# Patient Record
Sex: MALE | Race: WHITE | NOT HISPANIC OR LATINO | URBAN - NONMETROPOLITAN AREA
[De-identification: names, ages, dates, MRNs, and addresses within clinical notes are randomized per-mention and may not be internally consistent; named-entity substitution may affect disease eponyms.]

---

## 2021-07-13 ENCOUNTER — OFFICE VISIT (OUTPATIENT)
Dept: URBAN - NONMETROPOLITAN AREA CLINIC 2 | Facility: CLINIC | Age: 76
End: 2021-07-13
Payer: MEDICARE

## 2021-07-13 ENCOUNTER — WEB ENCOUNTER (OUTPATIENT)
Dept: URBAN - NONMETROPOLITAN AREA CLINIC 2 | Facility: CLINIC | Age: 76
End: 2021-07-13

## 2021-07-13 VITALS
HEART RATE: 72 BPM | SYSTOLIC BLOOD PRESSURE: 146 MMHG | DIASTOLIC BLOOD PRESSURE: 89 MMHG | HEIGHT: 69 IN | BODY MASS INDEX: 21.48 KG/M2 | WEIGHT: 145 LBS

## 2021-07-13 DIAGNOSIS — K59.00 CONSTIPATION, UNSPECIFIED CONSTIPATION TYPE: ICD-10-CM

## 2021-07-13 DIAGNOSIS — Z79.02 LONG TERM (CURRENT) USE OF ANTITHROMBOTICS/ANTIPLATELETS: ICD-10-CM

## 2021-07-13 DIAGNOSIS — Z86.010 PERSONAL HISTORY OF COLONIC POLYPS: ICD-10-CM

## 2021-07-13 PROCEDURE — 99203 OFFICE O/P NEW LOW 30 MIN: CPT | Performed by: INTERNAL MEDICINE

## 2021-07-13 RX ORDER — ATORVASTATIN CALCIUM 10 MG/1
1 TABLET TABLET, FILM COATED ORAL ONCE A DAY
Status: ACTIVE | COMMUNITY

## 2021-07-13 RX ORDER — METOPROLOL TARTRATE 25 MG/1
1 TABLET WITH FOOD TABLET, FILM COATED ORAL TWICE A DAY
Status: ACTIVE | COMMUNITY

## 2021-07-13 RX ORDER — ALLOPURINOL 300 MG/1
1 TABLET TABLET ORAL ONCE A DAY
Status: ACTIVE | COMMUNITY

## 2021-07-13 RX ORDER — POLYETHYLENE GLYCOL 3350, SODIUM SULFATE, SODIUM CHLORIDE, POTASSIUM CHLORIDE, ASCORBIC ACID, SODIUM ASCORBATE 140-9-5.2G
AS DIRECTED KIT ORAL AS DIRECTED
Qty: 280 GRAM | Refills: 0 | OUTPATIENT
Start: 2021-07-13 | End: 2021-07-14

## 2021-07-13 RX ORDER — FINASTERIDE 5 MG/1
1 TABLET TABLET, FILM COATED ORAL ONCE A DAY
Status: ACTIVE | COMMUNITY

## 2021-07-13 RX ORDER — FLUOXETINE HYDROCHLORIDE 10 MG/1
1 CAPSULE CAPSULE ORAL ONCE A DAY
Status: ACTIVE | COMMUNITY

## 2021-07-13 RX ORDER — DIPHENHYDRAMINE HYDROCHLORIDE 50 MG/1
1 CAPSULE AT BEDTIME AS NEEDED CAPSULE ORAL ONCE A DAY
Status: ACTIVE | COMMUNITY

## 2021-07-13 RX ORDER — ALFUZOSIN HYDROCHLORIDE 10 MG/1
1 TABLET IMMEDIATELY AFTER THE SAME MEAL TABLET, FILM COATED, EXTENDED RELEASE ORAL ONCE A DAY
Status: ACTIVE | COMMUNITY

## 2021-07-13 RX ORDER — NORTRIPTYLINE HYDROCHLORIDE 25 MG/1
1 CAPSULE CAPSULE ORAL ONCE A DAY
Status: ACTIVE | COMMUNITY

## 2021-07-13 RX ORDER — AMLODIPINE BESYLATE 5 MG/1
1 TABLET TABLET ORAL ONCE A DAY
Status: ACTIVE | COMMUNITY

## 2021-07-13 RX ORDER — APIXABAN 5 MG/1
AS DIRECTED TABLET, FILM COATED ORAL
Status: ACTIVE | COMMUNITY

## 2021-07-13 NOTE — HPI-TODAY'S VISIT:
7/13/2021: Initial Gastroenterology Clinic Visit  75 year old gentleman with past medical history of colon polyps, hypertension, atrial flutter on apixaban, hyperlipidemia, gout, personal history of colon polyps who presents for surveillance colonoscopy.   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 He believes he has had colon polyps in the past with recommendation for repeat colonoscopy every 5 years.  He denies melena, hematochezia, unintentional weight loss. He has a history of constipation but has been using a smooth move tea in order to facilitate bowel movements.   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   He denies family history of colon cancer.

## 2021-07-24 ENCOUNTER — TELEPHONE ENCOUNTER (OUTPATIENT)
Dept: URBAN - NONMETROPOLITAN AREA CLINIC 2 | Facility: CLINIC | Age: 76
End: 2021-07-24

## 2021-09-02 ENCOUNTER — OFFICE VISIT (OUTPATIENT)
Dept: URBAN - NONMETROPOLITAN AREA SURGERY CENTER 1 | Facility: SURGERY CENTER | Age: 76
End: 2021-09-02

## 2021-09-23 ENCOUNTER — OFFICE VISIT (OUTPATIENT)
Dept: URBAN - NONMETROPOLITAN AREA SURGERY CENTER 1 | Facility: SURGERY CENTER | Age: 76
End: 2021-09-23

## 2021-09-27 ENCOUNTER — OFFICE VISIT (OUTPATIENT)
Dept: URBAN - NONMETROPOLITAN AREA CLINIC 2 | Facility: CLINIC | Age: 76
End: 2021-09-27

## 2021-09-27 RX ORDER — DIPHENHYDRAMINE HYDROCHLORIDE 50 MG/1
1 CAPSULE AT BEDTIME AS NEEDED CAPSULE ORAL ONCE A DAY
Status: ACTIVE | COMMUNITY

## 2021-09-27 RX ORDER — NORTRIPTYLINE HYDROCHLORIDE 25 MG/1
1 CAPSULE CAPSULE ORAL ONCE A DAY
Status: ACTIVE | COMMUNITY

## 2021-09-27 RX ORDER — APIXABAN 5 MG/1
AS DIRECTED TABLET, FILM COATED ORAL
Status: ACTIVE | COMMUNITY

## 2021-09-27 RX ORDER — FLUOXETINE HYDROCHLORIDE 10 MG/1
1 CAPSULE CAPSULE ORAL ONCE A DAY
Status: ACTIVE | COMMUNITY

## 2021-09-27 RX ORDER — ATORVASTATIN CALCIUM 10 MG/1
1 TABLET TABLET, FILM COATED ORAL ONCE A DAY
Status: ACTIVE | COMMUNITY

## 2021-09-27 RX ORDER — METOPROLOL TARTRATE 25 MG/1
1 TABLET WITH FOOD TABLET, FILM COATED ORAL TWICE A DAY
Status: ACTIVE | COMMUNITY

## 2021-09-27 RX ORDER — AMLODIPINE BESYLATE 5 MG/1
1 TABLET TABLET ORAL ONCE A DAY
Status: ACTIVE | COMMUNITY

## 2021-09-27 RX ORDER — ALFUZOSIN HYDROCHLORIDE 10 MG/1
1 TABLET IMMEDIATELY AFTER THE SAME MEAL TABLET, FILM COATED, EXTENDED RELEASE ORAL ONCE A DAY
Status: ACTIVE | COMMUNITY

## 2021-09-27 RX ORDER — FINASTERIDE 5 MG/1
1 TABLET TABLET, FILM COATED ORAL ONCE A DAY
Status: ACTIVE | COMMUNITY

## 2021-09-27 RX ORDER — ALLOPURINOL 300 MG/1
1 TABLET TABLET ORAL ONCE A DAY
Status: ACTIVE | COMMUNITY

## 2021-09-27 NOTE — HPI-TODAY'S VISIT:
7/13/2021: Initial Gastroenterology Clinic Visit  75 year old gentleman with past medical history of colon polyps, hypertension, atrial flutter on apixaban, hyperlipidemia, gout, personal history of colon polyps who presents for surveillance colonoscopy.   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 He believes he has had colon polyps in the past with recommendation for repeat colonoscopy every 5 years.  He denies melena, hematochezia, unintentional weight loss. He has a history of constipation but has been using a smooth move tea in order to facilitate bowel movements.   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   He denies family history of colon cancer.

## 2021-09-30 ENCOUNTER — TELEPHONE ENCOUNTER (OUTPATIENT)
Dept: URBAN - NONMETROPOLITAN AREA CLINIC 2 | Facility: CLINIC | Age: 76
End: 2021-09-30

## 2021-09-30 ENCOUNTER — OFFICE VISIT (OUTPATIENT)
Dept: URBAN - NONMETROPOLITAN AREA SURGERY CENTER 1 | Facility: SURGERY CENTER | Age: 76
End: 2021-09-30
Payer: MEDICARE

## 2021-09-30 ENCOUNTER — CLAIMS CREATED FROM THE CLAIM WINDOW (OUTPATIENT)
Dept: URBAN - METROPOLITAN AREA CLINIC 4 | Facility: CLINIC | Age: 76
End: 2021-09-30
Payer: MEDICARE

## 2021-09-30 DIAGNOSIS — K63.89 BACTERIAL OVERGROWTH SYNDROME: ICD-10-CM

## 2021-09-30 DIAGNOSIS — Z86.010 ADENOMAS PERSONAL HISTORY OF COLONIC POLYPS: ICD-10-CM

## 2021-09-30 DIAGNOSIS — D12.2 BENIGN NEOPLASM OF ASCENDING COLON: ICD-10-CM

## 2021-09-30 DIAGNOSIS — D12.2 ADENOMA OF ASCENDING COLON: ICD-10-CM

## 2021-09-30 DIAGNOSIS — D12.3 ADENOMA OF TRANSVERSE COLON: ICD-10-CM

## 2021-09-30 DIAGNOSIS — K63.89 POLYP, HYPERPLASTIC: ICD-10-CM

## 2021-09-30 DIAGNOSIS — D12.5 BENIGN NEOPLASM OF SIGMOID COLON: ICD-10-CM

## 2021-09-30 DIAGNOSIS — D12.3 BENIGN NEOPLASM OF TRANSVERSE COLON: ICD-10-CM

## 2021-09-30 DIAGNOSIS — D12.5 ADENOMA OF SIGMOID COLON: ICD-10-CM

## 2021-09-30 PROCEDURE — G8907 PT DOC NO EVENTS ON DISCHARG: HCPCS | Performed by: INTERNAL MEDICINE

## 2021-09-30 PROCEDURE — 45380 COLONOSCOPY AND BIOPSY: CPT | Performed by: INTERNAL MEDICINE

## 2021-09-30 PROCEDURE — 45385 COLONOSCOPY W/LESION REMOVAL: CPT | Performed by: INTERNAL MEDICINE

## 2021-09-30 PROCEDURE — 88305 TISSUE EXAM BY PATHOLOGIST: CPT | Performed by: PATHOLOGY

## 2021-09-30 RX ORDER — FINASTERIDE 5 MG/1
1 TABLET TABLET, FILM COATED ORAL ONCE A DAY
Status: ACTIVE | COMMUNITY

## 2021-09-30 RX ORDER — NORTRIPTYLINE HYDROCHLORIDE 25 MG/1
1 CAPSULE CAPSULE ORAL ONCE A DAY
Status: ACTIVE | COMMUNITY

## 2021-09-30 RX ORDER — AMLODIPINE BESYLATE 5 MG/1
1 TABLET TABLET ORAL ONCE A DAY
Status: ACTIVE | COMMUNITY

## 2021-09-30 RX ORDER — DIPHENHYDRAMINE HYDROCHLORIDE 50 MG/1
1 CAPSULE AT BEDTIME AS NEEDED CAPSULE ORAL ONCE A DAY
Status: ACTIVE | COMMUNITY

## 2021-09-30 RX ORDER — ATORVASTATIN CALCIUM 10 MG/1
1 TABLET TABLET, FILM COATED ORAL ONCE A DAY
Status: ACTIVE | COMMUNITY

## 2021-09-30 RX ORDER — ALFUZOSIN HYDROCHLORIDE 10 MG/1
1 TABLET IMMEDIATELY AFTER THE SAME MEAL TABLET, FILM COATED, EXTENDED RELEASE ORAL ONCE A DAY
Status: ACTIVE | COMMUNITY

## 2021-09-30 RX ORDER — APIXABAN 5 MG/1
AS DIRECTED TABLET, FILM COATED ORAL
Status: ACTIVE | COMMUNITY

## 2021-09-30 RX ORDER — FLUOXETINE HYDROCHLORIDE 10 MG/1
1 CAPSULE CAPSULE ORAL ONCE A DAY
Status: ACTIVE | COMMUNITY

## 2021-09-30 RX ORDER — ALLOPURINOL 300 MG/1
1 TABLET TABLET ORAL ONCE A DAY
Status: ACTIVE | COMMUNITY

## 2021-09-30 RX ORDER — METOPROLOL TARTRATE 25 MG/1
1 TABLET WITH FOOD TABLET, FILM COATED ORAL TWICE A DAY
Status: ACTIVE | COMMUNITY

## 2021-11-17 ENCOUNTER — OFFICE VISIT (OUTPATIENT)
Dept: URBAN - NONMETROPOLITAN AREA CLINIC 13 | Facility: CLINIC | Age: 76
End: 2021-11-17

## 2021-11-17 RX ORDER — NORTRIPTYLINE HYDROCHLORIDE 25 MG/1
1 CAPSULE CAPSULE ORAL ONCE A DAY
Status: ACTIVE | COMMUNITY

## 2021-11-17 RX ORDER — ALFUZOSIN HYDROCHLORIDE 10 MG/1
1 TABLET IMMEDIATELY AFTER THE SAME MEAL TABLET, FILM COATED, EXTENDED RELEASE ORAL ONCE A DAY
Status: ACTIVE | COMMUNITY

## 2021-11-17 RX ORDER — ALLOPURINOL 300 MG/1
1 TABLET TABLET ORAL ONCE A DAY
Status: ACTIVE | COMMUNITY

## 2021-11-17 RX ORDER — ATORVASTATIN CALCIUM 10 MG/1
1 TABLET TABLET, FILM COATED ORAL ONCE A DAY
Status: ACTIVE | COMMUNITY

## 2021-11-17 RX ORDER — FINASTERIDE 5 MG/1
1 TABLET TABLET, FILM COATED ORAL ONCE A DAY
Status: ACTIVE | COMMUNITY

## 2021-11-17 RX ORDER — AMLODIPINE BESYLATE 5 MG/1
1 TABLET TABLET ORAL ONCE A DAY
Status: ACTIVE | COMMUNITY

## 2021-11-17 RX ORDER — FLUOXETINE HYDROCHLORIDE 10 MG/1
1 CAPSULE CAPSULE ORAL ONCE A DAY
Status: ACTIVE | COMMUNITY

## 2021-11-17 RX ORDER — METOPROLOL TARTRATE 25 MG/1
1 TABLET WITH FOOD TABLET, FILM COATED ORAL TWICE A DAY
Status: ACTIVE | COMMUNITY

## 2021-11-17 RX ORDER — APIXABAN 5 MG/1
AS DIRECTED TABLET, FILM COATED ORAL
Status: ACTIVE | COMMUNITY

## 2021-11-17 RX ORDER — DIPHENHYDRAMINE HYDROCHLORIDE 50 MG/1
1 CAPSULE AT BEDTIME AS NEEDED CAPSULE ORAL ONCE A DAY
Status: ACTIVE | COMMUNITY

## 2021-11-17 NOTE — HPI-TODAY'S VISIT:
7/13/2021: Initial Gastroenterology Clinic Visit   75 year old gentleman with past medical history of colon polyps, hypertension, atrial flutter on apixaban, hyperlipidemia, gout, personal history of colon polyps who presents for surveillance colonoscopy.   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 He believes he has had colon polyps in the past with recommendation for repeat colonoscopy every 5 years.  He denies melena, hematochezia, unintentional weight loss. He has a history of constipation but has been using a smooth move tea in order to facilitate bowel movements.   He has a PhD in food sciences. He previously worked for the [employer] in the "[employer]" sciences department.   He denies family history of colon cancer.  9/30/2021: Colonoscopy  The perianal and digital rectal examinations were normal. The terminal ileum was normal. 7 mm polyp in the cecum. Polypectomy performed. Five polyps in the ascending colon ranging from 3 to 5 mm. Polypectomy performed. Six polyps were found in the transverse colon, measuring 3 to 6 mm in size. Polypectomy performed. 5 mm nodule was found in the transverse colon. Biopsied. Two polyps in the sigmoid colon measuring 4 to 6 mm in size. Polypectomy performed. Multiple small and large mouthed diverticula were found in the sigmoid colon.  Retroflexion in the rectum normal.  9/30/2021: Pathology from Colonoscopy  (A) Colon, Sigmoid, Polypectomy: TUBULAR ADENOMA(S). (B) Colon, Transverse, Polypectomy: TUBULAR ADENOMA(S). (C) Colon, Ascending, Polypectomy: TUBULAR ADENOMA(S). HYPERPLASTIC POLYP(S). (D) Colon, Transverse, Biopsy: NO SIGNIFICANT ABNORMALITIES. See Comment. COMMENT: While the histologic findings in this biopsy are not enough to explain an endoscopic impression of a nodular lesion, a superficial biopsy may not be deep enough to explore a submucosal lesion. Clinical correlation is recommended. Plan: -Repeat colonoscopy in 6 months. -Cardiac clearance. -High fiber diet.

## 2022-01-12 ENCOUNTER — OFFICE VISIT (OUTPATIENT)
Dept: URBAN - NONMETROPOLITAN AREA CLINIC 13 | Facility: CLINIC | Age: 77
End: 2022-01-12
Payer: MEDICARE

## 2022-01-12 ENCOUNTER — WEB ENCOUNTER (OUTPATIENT)
Dept: URBAN - NONMETROPOLITAN AREA CLINIC 13 | Facility: CLINIC | Age: 77
End: 2022-01-12

## 2022-01-12 VITALS
HEIGHT: 69 IN | HEART RATE: 71 BPM | SYSTOLIC BLOOD PRESSURE: 135 MMHG | BODY MASS INDEX: 22.51 KG/M2 | DIASTOLIC BLOOD PRESSURE: 73 MMHG | WEIGHT: 152 LBS

## 2022-01-12 DIAGNOSIS — Z79.02 LONG TERM (CURRENT) USE OF ANTITHROMBOTICS/ANTIPLATELETS: ICD-10-CM

## 2022-01-12 DIAGNOSIS — K57.90 DIVERTICULOSIS: ICD-10-CM

## 2022-01-12 DIAGNOSIS — Z86.010 PERSONAL HISTORY OF COLONIC POLYPS: ICD-10-CM

## 2022-01-12 DIAGNOSIS — K59.00 CONSTIPATION, UNSPECIFIED CONSTIPATION TYPE: ICD-10-CM

## 2022-01-12 PROCEDURE — 99213 OFFICE O/P EST LOW 20 MIN: CPT | Performed by: INTERNAL MEDICINE

## 2022-01-12 RX ORDER — POLYETHYLENE GLYCOL 3350, SODIUM SULFATE, SODIUM CHLORIDE, POTASSIUM CHLORIDE, ASCORBIC ACID, SODIUM ASCORBATE 140-9-5.2G
AS DIRECTED KIT ORAL AS DIRECTED
Qty: 280 GRAM | Refills: 0 | OUTPATIENT
Start: 2022-01-12 | End: 2022-01-13

## 2022-01-12 RX ORDER — FINASTERIDE 5 MG/1
1 TABLET TABLET, FILM COATED ORAL ONCE A DAY
Status: ACTIVE | COMMUNITY

## 2022-01-12 RX ORDER — FLUOXETINE HYDROCHLORIDE 10 MG/1
1 CAPSULE CAPSULE ORAL ONCE A DAY
Status: ACTIVE | COMMUNITY

## 2022-01-12 RX ORDER — NORTRIPTYLINE HYDROCHLORIDE 25 MG/1
1 CAPSULE CAPSULE ORAL ONCE A DAY
Status: ACTIVE | COMMUNITY

## 2022-01-12 RX ORDER — ATORVASTATIN CALCIUM 10 MG/1
1 TABLET TABLET, FILM COATED ORAL ONCE A DAY
Status: ACTIVE | COMMUNITY

## 2022-01-12 RX ORDER — DIPHENHYDRAMINE HYDROCHLORIDE 50 MG/1
1 CAPSULE AT BEDTIME AS NEEDED CAPSULE ORAL ONCE A DAY
Status: ACTIVE | COMMUNITY

## 2022-01-12 RX ORDER — ALFUZOSIN HYDROCHLORIDE 10 MG/1
1 TABLET IMMEDIATELY AFTER THE SAME MEAL TABLET, FILM COATED, EXTENDED RELEASE ORAL ONCE A DAY
Status: ACTIVE | COMMUNITY

## 2022-01-12 RX ORDER — METOPROLOL TARTRATE 25 MG/1
1 TABLET WITH FOOD TABLET, FILM COATED ORAL TWICE A DAY
Status: ACTIVE | COMMUNITY

## 2022-01-12 RX ORDER — AMLODIPINE BESYLATE 5 MG/1
1 TABLET TABLET ORAL ONCE A DAY
Status: ACTIVE | COMMUNITY

## 2022-01-12 RX ORDER — ALLOPURINOL 300 MG/1
1 TABLET TABLET ORAL ONCE A DAY
Status: ACTIVE | COMMUNITY

## 2022-01-12 RX ORDER — APIXABAN 5 MG/1
AS DIRECTED TABLET, FILM COATED ORAL
Status: ACTIVE | COMMUNITY

## 2022-01-12 NOTE — HPI-TODAY'S VISIT:
7/13/2021: Initial Gastroenterology Clinic Visit    75 year old gentleman with past medical history of colon polyps, hypertension, atrial flutter on apixaban, hyperlipidemia, gout, personal history of colon polyps who presents for surveillance colonoscopy.   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 He believes he has had colon polyps in the past with recommendation for repeat colonoscopy every 5 years.  He denies melena, hematochezia, unintentional weight loss. He has a history of constipation but has been using a smooth move tea in order to facilitate bowel movements.   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   He denies family history of colon cancer.  9/30/2021: Colonoscopy  The perianal and digital rectal examinations were normal. The terminal ileum was normal. 7 mm polyp in the cecum. Polypectomy performed. Five polyps in the ascending colon ranging from 3 to 5 mm. Polypectomy performed. Six polyps were found in the transverse colon, measuring 3 to 6 mm in size. Polypectomy performed. 5 mm nodule was found in the transverse colon. Biopsied. Two polyps in the sigmoid colon measuring 4 to 6 mm in size. Polypectomy performed. Multiple small and large mouthed diverticula were found in the sigmoid colon.  Retroflexion in the rectum normal.  9/30/2021: Pathology from Colonoscopy  (A) Colon, Sigmoid, Polypectomy: TUBULAR ADENOMA(S). (B) Colon, Transverse, Polypectomy: TUBULAR ADENOMA(S). (C) Colon, Ascending, Polypectomy: TUBULAR ADENOMA(S). HYPERPLASTIC POLYP(S). (D) Colon, Transverse, Biopsy: NO SIGNIFICANT ABNORMALITIES. See Comment. COMMENT: While the histologic findings in this biopsy are not enough to explain an endoscopic impression of a nodular lesion, a superficial biopsy may not be deep enough to explore a submucosal lesion. Clinical correlation is recommended.  1/12/2022: Gastroenterology Follow-Up Visit  Mr. Crenshaw denies melena, hematochezia. He does note a history of constipation where he can go several days without having a bowel movement. He does use a smooth tea to help facilitate bowel movements.

## 2022-03-11 ENCOUNTER — CLAIMS CREATED FROM THE CLAIM WINDOW (OUTPATIENT)
Dept: URBAN - METROPOLITAN AREA CLINIC 4 | Facility: CLINIC | Age: 77
End: 2022-03-11
Payer: MEDICARE

## 2022-03-11 ENCOUNTER — OFFICE VISIT (OUTPATIENT)
Dept: URBAN - NONMETROPOLITAN AREA SURGERY CENTER 1 | Facility: SURGERY CENTER | Age: 77
End: 2022-03-11
Payer: MEDICARE

## 2022-03-11 DIAGNOSIS — D12.3 ADENOMA OF TRANSVERSE COLON: ICD-10-CM

## 2022-03-11 DIAGNOSIS — D12.3 BENIGN NEOPLASM OF TRANSVERSE COLON: ICD-10-CM

## 2022-03-11 DIAGNOSIS — D12.5 BENIGN NEOPLASM OF SIGMOID COLON: ICD-10-CM

## 2022-03-11 DIAGNOSIS — D12.0 BENIGN NEOPLASM OF CECUM: ICD-10-CM

## 2022-03-11 DIAGNOSIS — D12.2 ADENOMA OF ASCENDING COLON: ICD-10-CM

## 2022-03-11 DIAGNOSIS — D12.0 ADENOMA OF CECUM: ICD-10-CM

## 2022-03-11 DIAGNOSIS — D12.2 BENIGN NEOPLASM OF ASCENDING COLON: ICD-10-CM

## 2022-03-11 DIAGNOSIS — Z86.010 ADENOMAS PERSONAL HISTORY OF COLONIC POLYPS: ICD-10-CM

## 2022-03-11 DIAGNOSIS — D12.5 ADENOMA OF SIGMOID COLON: ICD-10-CM

## 2022-03-11 PROCEDURE — G8907 PT DOC NO EVENTS ON DISCHARG: HCPCS | Performed by: INTERNAL MEDICINE

## 2022-03-11 PROCEDURE — 88305 TISSUE EXAM BY PATHOLOGIST: CPT | Performed by: PATHOLOGY

## 2022-03-11 PROCEDURE — 45385 COLONOSCOPY W/LESION REMOVAL: CPT | Performed by: INTERNAL MEDICINE

## 2022-03-11 PROCEDURE — 45380 COLONOSCOPY AND BIOPSY: CPT | Performed by: INTERNAL MEDICINE

## 2022-03-11 RX ORDER — ALFUZOSIN HYDROCHLORIDE 10 MG/1
1 TABLET IMMEDIATELY AFTER THE SAME MEAL TABLET, FILM COATED, EXTENDED RELEASE ORAL ONCE A DAY
Status: ACTIVE | COMMUNITY

## 2022-03-11 RX ORDER — FINASTERIDE 5 MG/1
1 TABLET TABLET, FILM COATED ORAL ONCE A DAY
Status: ACTIVE | COMMUNITY

## 2022-03-11 RX ORDER — METOPROLOL TARTRATE 25 MG/1
1 TABLET WITH FOOD TABLET, FILM COATED ORAL TWICE A DAY
Status: ACTIVE | COMMUNITY

## 2022-03-11 RX ORDER — FLUOXETINE HYDROCHLORIDE 10 MG/1
1 CAPSULE CAPSULE ORAL ONCE A DAY
Status: ACTIVE | COMMUNITY

## 2022-03-11 RX ORDER — DIPHENHYDRAMINE HYDROCHLORIDE 50 MG/1
1 CAPSULE AT BEDTIME AS NEEDED CAPSULE ORAL ONCE A DAY
Status: ACTIVE | COMMUNITY

## 2022-03-11 RX ORDER — ALLOPURINOL 300 MG/1
1 TABLET TABLET ORAL ONCE A DAY
Status: ACTIVE | COMMUNITY

## 2022-03-11 RX ORDER — NORTRIPTYLINE HYDROCHLORIDE 25 MG/1
1 CAPSULE CAPSULE ORAL ONCE A DAY
Status: ACTIVE | COMMUNITY

## 2022-03-11 RX ORDER — ATORVASTATIN CALCIUM 10 MG/1
1 TABLET TABLET, FILM COATED ORAL ONCE A DAY
Status: ACTIVE | COMMUNITY

## 2022-03-11 RX ORDER — APIXABAN 5 MG/1
AS DIRECTED TABLET, FILM COATED ORAL
Status: ACTIVE | COMMUNITY

## 2022-03-11 RX ORDER — AMLODIPINE BESYLATE 5 MG/1
1 TABLET TABLET ORAL ONCE A DAY
Status: ACTIVE | COMMUNITY

## 2022-03-30 ENCOUNTER — OFFICE VISIT (OUTPATIENT)
Dept: URBAN - NONMETROPOLITAN AREA CLINIC 13 | Facility: CLINIC | Age: 77
End: 2022-03-30

## 2022-03-30 RX ORDER — NORTRIPTYLINE HYDROCHLORIDE 25 MG/1
1 CAPSULE CAPSULE ORAL ONCE A DAY
Status: ACTIVE | COMMUNITY

## 2022-03-30 RX ORDER — DIPHENHYDRAMINE HYDROCHLORIDE 50 MG/1
1 CAPSULE AT BEDTIME AS NEEDED CAPSULE ORAL ONCE A DAY
Status: ACTIVE | COMMUNITY

## 2022-03-30 RX ORDER — ALFUZOSIN HYDROCHLORIDE 10 MG/1
1 TABLET IMMEDIATELY AFTER THE SAME MEAL TABLET, FILM COATED, EXTENDED RELEASE ORAL ONCE A DAY
Status: ACTIVE | COMMUNITY

## 2022-03-30 RX ORDER — METOPROLOL TARTRATE 25 MG/1
1 TABLET WITH FOOD TABLET, FILM COATED ORAL TWICE A DAY
Status: ACTIVE | COMMUNITY

## 2022-03-30 RX ORDER — FLUOXETINE HYDROCHLORIDE 10 MG/1
1 CAPSULE CAPSULE ORAL ONCE A DAY
Status: ACTIVE | COMMUNITY

## 2022-03-30 RX ORDER — ATORVASTATIN CALCIUM 10 MG/1
1 TABLET TABLET, FILM COATED ORAL ONCE A DAY
Status: ACTIVE | COMMUNITY

## 2022-03-30 RX ORDER — AMLODIPINE BESYLATE 5 MG/1
1 TABLET TABLET ORAL ONCE A DAY
Status: ACTIVE | COMMUNITY

## 2022-03-30 RX ORDER — ALLOPURINOL 300 MG/1
1 TABLET TABLET ORAL ONCE A DAY
Status: ACTIVE | COMMUNITY

## 2022-03-30 RX ORDER — FINASTERIDE 5 MG/1
1 TABLET TABLET, FILM COATED ORAL ONCE A DAY
Status: ACTIVE | COMMUNITY

## 2022-03-30 RX ORDER — APIXABAN 5 MG/1
AS DIRECTED TABLET, FILM COATED ORAL
Status: ACTIVE | COMMUNITY

## 2022-03-30 NOTE — HPI-TODAY'S VISIT:
7/13/2021: Initial Gastroenterology Clinic Visit     75 year old gentleman with past medical history of colon polyps, hypertension, atrial flutter on apixaban, hyperlipidemia, gout, personal history of colon polyps who presents for surveillance colonoscopy.   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 He believes he has had colon polyps in the past with recommendation for repeat colonoscopy every 5 years.  He denies melena, hematochezia, unintentional weight loss. He has a history of constipation but has been using a smooth move tea in order to facilitate bowel movements.   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   He denies family history of colon cancer.  9/30/2021: Colonoscopy  The perianal and digital rectal examinations were normal. The terminal ileum was normal. 7 mm polyp in the cecum. Polypectomy performed. Five polyps in the ascending colon ranging from 3 to 5 mm. Polypectomy performed. Six polyps were found in the transverse colon, measuring 3 to 6 mm in size. Polypectomy performed. 5 mm nodule was found in the transverse colon. Biopsied. Two polyps in the sigmoid colon measuring 4 to 6 mm in size. Polypectomy performed. Multiple small and large mouthed diverticula were found in the sigmoid colon.  Retroflexion in the rectum normal.  9/30/2021: Pathology from Colonoscopy  (A) Colon, Sigmoid, Polypectomy: TUBULAR ADENOMA(S). (B) Colon, Transverse, Polypectomy: TUBULAR ADENOMA(S). (C) Colon, Ascending, Polypectomy: TUBULAR ADENOMA(S). HYPERPLASTIC POLYP(S). (D) Colon, Transverse, Biopsy: NO SIGNIFICANT ABNORMALITIES. See Comment. COMMENT: While the histologic findings in this biopsy are not enough to explain an endoscopic impression of a nodular lesion, a superficial biopsy may not be deep enough to explore a submucosal lesion. Clinical correlation is recommended.  1/12/2022: Gastroenterology Follow-Up Visit  Mr. Crenshaw denies melena, hematochezia. He does note a history of constipation where he can go several days without having a bowel movement. He does use a smooth tea to help facilitate bowel movements.  3/11/2022: Colonoscopy  3 mm polyp in the cecum. Polypectomy performed. Three sessile polyps were found in the ascending colon ranging from 2 to 3 mm. Polypectomy performed. 4 mm polyp in the transverse colon. Polypectomy performed.  3 mm polyp in the sigmoid colon. Polypectomy performed. Multiple small and large mouthed diverticula were found in the sigmoid colon.  3/11/2022: Pathology from Colonoscopy  (A) Cecum, Polypectomy: TUBULAR ADENOMA(S). (B) Colon, Ascending, Polypectomy: TUBULAR ADENOMA(S). (C) Colon, Transverse, Polypectomy: TUBULAR ADENOMA(S). (D) Colon, Sigmoid, Polypectomy: TUBULAR ADENOMA(S). Plan: -High fiber diet. -Colonoscopy in 1 year.

## 2022-08-10 ENCOUNTER — LAB OUTSIDE AN ENCOUNTER (OUTPATIENT)
Dept: URBAN - NONMETROPOLITAN AREA CLINIC 13 | Facility: CLINIC | Age: 77
End: 2022-08-10

## 2022-08-10 ENCOUNTER — WEB ENCOUNTER (OUTPATIENT)
Dept: URBAN - NONMETROPOLITAN AREA CLINIC 13 | Facility: CLINIC | Age: 77
End: 2022-08-10

## 2022-08-10 ENCOUNTER — OFFICE VISIT (OUTPATIENT)
Dept: URBAN - NONMETROPOLITAN AREA CLINIC 13 | Facility: CLINIC | Age: 77
End: 2022-08-10
Payer: MEDICARE

## 2022-08-10 VITALS
WEIGHT: 158 LBS | SYSTOLIC BLOOD PRESSURE: 128 MMHG | HEIGHT: 69 IN | HEART RATE: 74 BPM | DIASTOLIC BLOOD PRESSURE: 80 MMHG | BODY MASS INDEX: 23.4 KG/M2

## 2022-08-10 DIAGNOSIS — Z86.010 PERSONAL HISTORY OF COLONIC POLYPS: ICD-10-CM

## 2022-08-10 DIAGNOSIS — K57.90 DIVERTICULOSIS: ICD-10-CM

## 2022-08-10 DIAGNOSIS — K59.00 CONSTIPATION, UNSPECIFIED CONSTIPATION TYPE: ICD-10-CM

## 2022-08-10 DIAGNOSIS — R93.5 ABNORMAL COMPUTED TOMOGRAPHY OF ABDOMEN AND PELVIS: ICD-10-CM

## 2022-08-10 DIAGNOSIS — Z79.02 LONG TERM (CURRENT) USE OF ANTITHROMBOTICS/ANTIPLATELETS: ICD-10-CM

## 2022-08-10 PROCEDURE — 99214 OFFICE O/P EST MOD 30 MIN: CPT | Performed by: INTERNAL MEDICINE

## 2022-08-10 RX ORDER — ALFUZOSIN HYDROCHLORIDE 10 MG/1
1 TABLET IMMEDIATELY AFTER THE SAME MEAL TABLET, FILM COATED, EXTENDED RELEASE ORAL ONCE A DAY
Status: ACTIVE | COMMUNITY

## 2022-08-10 RX ORDER — FLUOXETINE HYDROCHLORIDE 10 MG/1
1 CAPSULE CAPSULE ORAL ONCE A DAY
Status: ACTIVE | COMMUNITY

## 2022-08-10 RX ORDER — APIXABAN 5 MG/1
AS DIRECTED TABLET, FILM COATED ORAL
Status: ACTIVE | COMMUNITY

## 2022-08-10 RX ORDER — METOPROLOL TARTRATE 25 MG/1
1 TABLET WITH FOOD TABLET, FILM COATED ORAL TWICE A DAY
Status: ACTIVE | COMMUNITY

## 2022-08-10 RX ORDER — FINASTERIDE 5 MG/1
1 TABLET TABLET, FILM COATED ORAL ONCE A DAY
Status: ACTIVE | COMMUNITY

## 2022-08-10 RX ORDER — ATORVASTATIN CALCIUM 10 MG/1
1 TABLET TABLET, FILM COATED ORAL ONCE A DAY
Status: ACTIVE | COMMUNITY

## 2022-08-10 RX ORDER — NORTRIPTYLINE HYDROCHLORIDE 25 MG/1
1 CAPSULE CAPSULE ORAL ONCE A DAY
Status: ACTIVE | COMMUNITY

## 2022-08-10 RX ORDER — AMLODIPINE BESYLATE 5 MG/1
1 TABLET TABLET ORAL ONCE A DAY
Status: ACTIVE | COMMUNITY

## 2022-08-10 RX ORDER — DIPHENHYDRAMINE HYDROCHLORIDE 50 MG/1
1 CAPSULE AT BEDTIME AS NEEDED CAPSULE ORAL ONCE A DAY
Status: ACTIVE | COMMUNITY

## 2022-08-10 RX ORDER — ALLOPURINOL 300 MG/1
1 TABLET TABLET ORAL ONCE A DAY
Status: ACTIVE | COMMUNITY

## 2022-08-10 NOTE — HPI-TODAY'S VISIT:
7/13/2021: Initial Gastroenterology Clinic Visit      75 year old gentleman with past medical history of colon polyps, hypertension, atrial flutter on apixaban, hyperlipidemia, gout, personal history of colon polyps who presents for surveillance colonoscopy.   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 He believes he has had colon polyps in the past with recommendation for repeat colonoscopy every 5 years.  He denies melena, hematochezia, unintentional weight loss. He has a history of constipation but has been using a smooth move tea in order to facilitate bowel movements.   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   He denies family history of colon cancer.  9/30/2021: Colonoscopy  The perianal and digital rectal examinations were normal. The terminal ileum was normal. 7 mm polyp in the cecum. Polypectomy performed. Five polyps in the ascending colon ranging from 3 to 5 mm. Polypectomy performed. Six polyps were found in the transverse colon, measuring 3 to 6 mm in size. Polypectomy performed. 5 mm nodule was found in the transverse colon. Biopsied. Two polyps in the sigmoid colon measuring 4 to 6 mm in size. Polypectomy performed. Multiple small and large mouthed diverticula were found in the sigmoid colon.  Retroflexion in the rectum normal.  9/30/2021: Pathology from Colonoscopy  (A) Colon, Sigmoid, Polypectomy: TUBULAR ADENOMA(S). (B) Colon, Transverse, Polypectomy: TUBULAR ADENOMA(S). (C) Colon, Ascending, Polypectomy: TUBULAR ADENOMA(S). HYPERPLASTIC POLYP(S). (D) Colon, Transverse, Biopsy: NO SIGNIFICANT ABNORMALITIES. See Comment. COMMENT: While the histologic findings in this biopsy are not enough to explain an endoscopic impression of a nodular lesion, a superficial biopsy may not be deep enough to explore a submucosal lesion. Clinical correlation is recommended.  1/12/2022: Gastroenterology Follow-Up Visit  Mr. Crenshaw denies melena, hematochezia. He does note a history of constipation where he can go several days without having a bowel movement. He does use a smooth tea to help facilitate bowel movements.  3/11/2022: Colonoscopy  3 mm polyp in the cecum. Polypectomy performed. Three sessile polyps were found in the ascending colon ranging from 2 to 3 mm. Polypectomy performed. 4 mm polyp in the transverse colon. Polypectomy performed.  3 mm polyp in the sigmoid colon. Polypectomy performed. Multiple small and large mouthed diverticula were found in the sigmoid colon.  3/11/2022: Pathology from Colonoscopy  (A) Cecum, Polypectomy: TUBULAR ADENOMA(S). (B) Colon, Ascending, Polypectomy: TUBULAR ADENOMA(S). (C) Colon, Transverse, Polypectomy: TUBULAR ADENOMA(S). (D) Colon, Sigmoid, Polypectomy: TUBULAR ADENOMA(S).   8/10/2022: Gastroenterology Follow-Up Visit Dr. Crenshaw presents for follow-up. He was seen by his Urologist for monitoring of a right kidney lesion. Underwent CT Scan Abdomen and Pelvis WITHOUT Contrast 4/13/2022 which showed "4 cm rounded area of primarily fluid attenuation which demonstrate a central area of the focal enhancement measuring 2.6 cm in greatest craniocaudal dimension." A subsequent CT Abdomen and Pelvis WITH Contrast and WITHOUT Contrast was performed 5/11/2022 which showed a "4.5 cm area of soft tissue density in the right mid to lower abdomen which demonstrates nodular enhancement centrally concerning for a Meckel's diverticulum."  Denies abdominal pain, melena, hematochezia, unintentional weight loss.

## 2022-09-29 ENCOUNTER — OFFICE VISIT (OUTPATIENT)
Dept: URBAN - NONMETROPOLITAN AREA CLINIC 2 | Facility: CLINIC | Age: 77
End: 2022-09-29
Payer: MEDICARE

## 2022-09-29 VITALS
WEIGHT: 157.4 LBS | DIASTOLIC BLOOD PRESSURE: 72 MMHG | HEART RATE: 81 BPM | HEIGHT: 69 IN | BODY MASS INDEX: 23.31 KG/M2 | SYSTOLIC BLOOD PRESSURE: 111 MMHG

## 2022-09-29 DIAGNOSIS — K57.90 DIVERTICULOSIS: ICD-10-CM

## 2022-09-29 DIAGNOSIS — K59.00 CONSTIPATION, UNSPECIFIED CONSTIPATION TYPE: ICD-10-CM

## 2022-09-29 DIAGNOSIS — Z79.02 LONG TERM (CURRENT) USE OF ANTITHROMBOTICS/ANTIPLATELETS: ICD-10-CM

## 2022-09-29 DIAGNOSIS — K66.1 INTRAPERITONEAL BLEEDING: ICD-10-CM

## 2022-09-29 DIAGNOSIS — R93.5 ABNORMAL COMPUTED TOMOGRAPHY OF ABDOMEN AND PELVIS: ICD-10-CM

## 2022-09-29 DIAGNOSIS — Z86.010 PERSONAL HISTORY OF COLONIC POLYPS: ICD-10-CM

## 2022-09-29 PROCEDURE — 99214 OFFICE O/P EST MOD 30 MIN: CPT | Performed by: INTERNAL MEDICINE

## 2022-09-29 RX ORDER — ATORVASTATIN CALCIUM 10 MG/1
1 TABLET TABLET, FILM COATED ORAL ONCE A DAY
Status: ACTIVE | COMMUNITY

## 2022-09-29 RX ORDER — ALFUZOSIN HYDROCHLORIDE 10 MG/1
1 TABLET IMMEDIATELY AFTER THE SAME MEAL TABLET, FILM COATED, EXTENDED RELEASE ORAL ONCE A DAY
Status: ACTIVE | COMMUNITY

## 2022-09-29 RX ORDER — FLUOXETINE HYDROCHLORIDE 10 MG/1
1 CAPSULE CAPSULE ORAL ONCE A DAY
Status: ACTIVE | COMMUNITY

## 2022-09-29 RX ORDER — FINASTERIDE 5 MG/1
1 TABLET TABLET, FILM COATED ORAL ONCE A DAY
Status: ACTIVE | COMMUNITY

## 2022-09-29 RX ORDER — AMLODIPINE BESYLATE 5 MG/1
1 TABLET TABLET ORAL ONCE A DAY
Status: ACTIVE | COMMUNITY

## 2022-09-29 RX ORDER — APIXABAN 5 MG/1
AS DIRECTED TABLET, FILM COATED ORAL
Status: ACTIVE | COMMUNITY

## 2022-09-29 RX ORDER — DIPHENHYDRAMINE HYDROCHLORIDE 50 MG/1
1 CAPSULE AT BEDTIME AS NEEDED CAPSULE ORAL ONCE A DAY
Status: ACTIVE | COMMUNITY

## 2022-09-29 RX ORDER — ALLOPURINOL 300 MG/1
1 TABLET TABLET ORAL ONCE A DAY
Status: ACTIVE | COMMUNITY

## 2022-09-29 RX ORDER — METOPROLOL TARTRATE 25 MG/1
1 TABLET WITH FOOD TABLET, FILM COATED ORAL TWICE A DAY
Status: ACTIVE | COMMUNITY

## 2022-09-29 RX ORDER — NORTRIPTYLINE HYDROCHLORIDE 25 MG/1
1 CAPSULE CAPSULE ORAL ONCE A DAY
Status: ACTIVE | COMMUNITY

## 2022-09-29 NOTE — HPI-TODAY'S VISIT:
7/13/2021: Initial Gastroenterology Clinic Visit      75 year old gentleman with past medical history of colon polyps, hypertension, atrial flutter on apixaban, hyperlipidemia, gout, personal history of colon polyps who presents for surveillance colonoscopy.   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 He believes he has had colon polyps in the past with recommendation for repeat colonoscopy every 5 years.  He denies melena, hematochezia, unintentional weight loss. He has a history of constipation but has been using a smooth move tea in order to facilitate bowel movements.   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   He denies family history of colon cancer.  9/30/2021: Colonoscopy  The perianal and digital rectal examinations were normal. The terminal ileum was normal. 7 mm polyp in the cecum. Polypectomy performed. Five polyps in the ascending colon ranging from 3 to 5 mm. Polypectomy performed. Six polyps were found in the transverse colon, measuring 3 to 6 mm in size. Polypectomy performed. 5 mm nodule was found in the transverse colon. Biopsied. Two polyps in the sigmoid colon measuring 4 to 6 mm in size. Polypectomy performed. Multiple small and large mouthed diverticula were found in the sigmoid colon.  Retroflexion in the rectum normal.  9/30/2021: Pathology from Colonoscopy  (A) Colon, Sigmoid, Polypectomy: TUBULAR ADENOMA(S). (B) Colon, Transverse, Polypectomy: TUBULAR ADENOMA(S). (C) Colon, Ascending, Polypectomy: TUBULAR ADENOMA(S). HYPERPLASTIC POLYP(S). (D) Colon, Transverse, Biopsy: NO SIGNIFICANT ABNORMALITIES. See Comment. COMMENT: While the histologic findings in this biopsy are not enough to explain an endoscopic impression of a nodular lesion, a superficial biopsy may not be deep enough to explore a submucosal lesion. Clinical correlation is recommended.  1/12/2022: Gastroenterology Follow-Up Visit  Mr. Crenshaw denies melena, hematochezia. He does note a history of constipation where he can go several days without having a bowel movement. He does use a smooth tea to help facilitate bowel movements.  3/11/2022: Colonoscopy  3 mm polyp in the cecum. Polypectomy performed. Three sessile polyps were found in the ascending colon ranging from 2 to 3 mm. Polypectomy performed. 4 mm polyp in the transverse colon. Polypectomy performed.  3 mm polyp in the sigmoid colon. Polypectomy performed. Multiple small and large mouthed diverticula were found in the sigmoid colon.  3/11/2022: Pathology from Colonoscopy  (A) Cecum, Polypectomy: TUBULAR ADENOMA(S). (B) Colon, Ascending, Polypectomy: TUBULAR ADENOMA(S). (C) Colon, Transverse, Polypectomy: TUBULAR ADENOMA(S). (D) Colon, Sigmoid, Polypectomy: TUBULAR ADENOMA(S).   8/10/2022: Gastroenterology Follow-Up Visit Dr. Crenshaw presents for follow-up. He was seen by his Urologist for monitoring of a right kidney lesion. Underwent CT Scan Abdomen and Pelvis WITHOUT Contrast 4/13/2022 which showed "4 cm rounded area of primarily fluid attenuation which demonstrate a central area of the focal enhancement measuring 2.6 cm in greatest craniocaudal dimension." A subsequent CT Abdomen and Pelvis WITH Contrast and WITHOUT Contrast was performed 5/11/2022 which showed a "4.5 cm area of soft tissue density in the right mid to lower abdomen which demonstrates nodular enhancement centrally concerning for a Meckel's diverticulum."  Denies abdominal pain, melena, hematochezia, unintentional weight loss.  9/29/2022: Gastroenterology Follow-Up Visit Dr. Crenshaw was [facility] to 9/3/2022 for spontaneous intraperitoneal bleeding in the setting of apixaban use. He was evaluated by General Surgery who recommended holding apixaban at the time and follow-up as outpatient. He has subsequently re-started apixaban. Denies abdominal pain/melena/hematochezia. In order to evaluate the question of a soft tissue density in the right mid to lower abdomen, an MR-Enterography was performed on 9/21/2022 which did NOT reveal evidence of any process within the small bowel or large bowel. The MR-Enterography did reveal large fecal burden in the right colon suggesting constipation.

## 2022-10-13 PROBLEM — 305058001: Status: ACTIVE | Noted: 2021-07-24

## 2022-10-13 PROBLEM — 443826006: Status: ACTIVE | Noted: 2022-10-13

## 2022-10-13 PROBLEM — 397881000: Status: ACTIVE | Noted: 2022-01-16

## 2022-10-13 PROBLEM — 14760008: Status: ACTIVE | Noted: 2021-07-24

## 2022-10-13 PROBLEM — 15634181000119107: Status: ACTIVE | Noted: 2022-08-10

## 2023-01-30 ENCOUNTER — OFFICE VISIT (OUTPATIENT)
Dept: URBAN - NONMETROPOLITAN AREA CLINIC 13 | Facility: CLINIC | Age: 78
End: 2023-01-30
Payer: MEDICARE

## 2023-01-30 ENCOUNTER — DASHBOARD ENCOUNTERS (OUTPATIENT)
Age: 78
End: 2023-01-30

## 2023-01-30 VITALS
SYSTOLIC BLOOD PRESSURE: 151 MMHG | HEART RATE: 81 BPM | WEIGHT: 160 LBS | DIASTOLIC BLOOD PRESSURE: 84 MMHG | HEIGHT: 69 IN | TEMPERATURE: 98.5 F | BODY MASS INDEX: 23.7 KG/M2

## 2023-01-30 DIAGNOSIS — K59.01 CONSTIPATION BY DELAYED COLONIC TRANSIT: ICD-10-CM

## 2023-01-30 DIAGNOSIS — Z79.01 CHRONIC ANTICOAGULATION: ICD-10-CM

## 2023-01-30 DIAGNOSIS — Z86.010 PERSONAL HISTORY OF COLONIC POLYPS: ICD-10-CM

## 2023-01-30 PROBLEM — 35298007: Status: ACTIVE | Noted: 2023-01-30

## 2023-01-30 PROBLEM — 711150003: Status: ACTIVE | Noted: 2023-01-30

## 2023-01-30 PROCEDURE — 99214 OFFICE O/P EST MOD 30 MIN: CPT | Performed by: NURSE PRACTITIONER

## 2023-01-30 RX ORDER — FLUOXETINE HYDROCHLORIDE 10 MG/1
1 CAPSULE CAPSULE ORAL ONCE A DAY
Status: ACTIVE | COMMUNITY

## 2023-01-30 RX ORDER — SODIUM, POTASSIUM,MAG SULFATES 17.5-3.13G
177ML SOLUTION, RECONSTITUTED, ORAL ORAL
Qty: 1 | OUTPATIENT
Start: 2023-01-30 | End: 2023-02-01

## 2023-01-30 RX ORDER — DIPHENHYDRAMINE HYDROCHLORIDE 50 MG/1
1 CAPSULE AT BEDTIME AS NEEDED CAPSULE ORAL ONCE A DAY
Status: ACTIVE | COMMUNITY

## 2023-01-30 RX ORDER — ALFUZOSIN HYDROCHLORIDE 10 MG/1
1 TABLET IMMEDIATELY AFTER THE SAME MEAL TABLET, FILM COATED, EXTENDED RELEASE ORAL ONCE A DAY
Status: ACTIVE | COMMUNITY

## 2023-01-30 RX ORDER — ATORVASTATIN CALCIUM 10 MG/1
1 TABLET TABLET, FILM COATED ORAL ONCE A DAY
Status: ACTIVE | COMMUNITY

## 2023-01-30 RX ORDER — NORTRIPTYLINE HYDROCHLORIDE 25 MG/1
1 CAPSULE CAPSULE ORAL ONCE A DAY
Status: ACTIVE | COMMUNITY

## 2023-01-30 RX ORDER — AMLODIPINE BESYLATE 5 MG/1
1 TABLET TABLET ORAL ONCE A DAY
Status: ACTIVE | COMMUNITY

## 2023-01-30 RX ORDER — METOPROLOL TARTRATE 25 MG/1
1 TABLET WITH FOOD TABLET, FILM COATED ORAL TWICE A DAY
Status: ACTIVE | COMMUNITY

## 2023-01-30 RX ORDER — FINASTERIDE 5 MG/1
1 TABLET TABLET, FILM COATED ORAL ONCE A DAY
Status: ACTIVE | COMMUNITY

## 2023-01-30 RX ORDER — ALLOPURINOL 300 MG/1
1 TABLET TABLET ORAL ONCE A DAY
Status: ACTIVE | COMMUNITY

## 2023-01-30 RX ORDER — APIXABAN 5 MG/1
AS DIRECTED TABLET, FILM COATED ORAL
Status: ACTIVE | COMMUNITY

## 2023-01-30 NOTE — HPI-TODAY'S VISIT:
Patient is a 77-year-old male with a past medical history of atrial fibrillation and hypertension on Eliquis under the care of Dr. Marr who presents for colonoscopy.  Previously, he had a colonoscopy with Dr. Leon 7 with 13 polyps that were TA in 2021 and then 2022 colonoscopy was 6 small TA polyps.  Was recommended to have a repeat in 1 year that is what brings him in today.  He states prior to this, he had 2 colonoscopies with Dr. Henry.  1 had 1 or 2 small polyps and the other had no polyps.  He is currently without GI complaint.  He does use in a smooth move tea and this helps him had a regular BM sb

## 2023-02-07 PROBLEM — 428283002: Status: ACTIVE | Noted: 2021-07-13

## 2023-02-17 ENCOUNTER — TELEPHONE ENCOUNTER (OUTPATIENT)
Dept: URBAN - NONMETROPOLITAN AREA CLINIC 13 | Facility: CLINIC | Age: 78
End: 2023-02-17

## 2023-02-22 ENCOUNTER — CLAIMS CREATED FROM THE CLAIM WINDOW (OUTPATIENT)
Dept: URBAN - METROPOLITAN AREA CLINIC 4 | Facility: CLINIC | Age: 78
End: 2023-02-22
Payer: MEDICARE

## 2023-02-22 ENCOUNTER — OFFICE VISIT (OUTPATIENT)
Dept: URBAN - NONMETROPOLITAN AREA SURGERY CENTER 1 | Facility: SURGERY CENTER | Age: 78
End: 2023-02-22
Payer: MEDICARE

## 2023-02-22 DIAGNOSIS — D12.2 ADENOMA OF ASCENDING COLON: ICD-10-CM

## 2023-02-22 DIAGNOSIS — D12.2 BENIGN NEOPLASM OF ASCENDING COLON: ICD-10-CM

## 2023-02-22 DIAGNOSIS — Z86.010 ADENOMAS PERSONAL HISTORY OF COLONIC POLYPS: ICD-10-CM

## 2023-02-22 DIAGNOSIS — D12.5 ADENOMA OF SIGMOID COLON: ICD-10-CM

## 2023-02-22 PROCEDURE — 45385 COLONOSCOPY W/LESION REMOVAL: CPT | Performed by: INTERNAL MEDICINE

## 2023-02-22 PROCEDURE — 88305 TISSUE EXAM BY PATHOLOGIST: CPT | Performed by: PATHOLOGY

## 2023-02-22 PROCEDURE — G8907 PT DOC NO EVENTS ON DISCHARG: HCPCS | Performed by: INTERNAL MEDICINE
